# Patient Record
(demographics unavailable — no encounter records)

---

## 2024-10-31 NOTE — CARDIOLOGY SUMMARY
[de-identified] :  Echo: Echo 3/8/2023. Normal LV function. EF 65%. Delayed septal activation consistent with LBBB. Mild diastolic dysfunction. Trivial mitral thickening with trivial MR. Trivial aortic sclerosis with trivial AI. Small anterior effusion without hemodynamic compromise. No prior echo for comparison.

## 2024-10-31 NOTE — PHYSICAL EXAM
[Well Developed] : well developed [Well Nourished] : well nourished [No Acute Distress] : no acute distress [Normal Conjunctiva] : normal conjunctiva [Normal Venous Pressure] : normal venous pressure [No Carotid Bruit] : no carotid bruit [Normal S1, S2] : normal S1, S2 [No Murmur] : no murmur [No Rub] : no rub [No Gallop] : no gallop [Clear Lung Fields] : clear lung fields [Good Air Entry] : good air entry [No Respiratory Distress] : no respiratory distress  [Soft] : abdomen soft [Non Tender] : non-tender [No Masses/organomegaly] : no masses/organomegaly [Normal Bowel Sounds] : normal bowel sounds [Moves all extremities] : moves all extremities [No Focal Deficits] : no focal deficits [Normal Speech] : normal speech [No ulcers] : no ulcers [No varicosities] : no varicosities [No chronic venous stasis changes] : no chronic venous stasis changes [No cyanosis] : no cyanosis [No rashes] : no rashes [Normal peripheral pulses] : : normal peripheral pulses [de-identified] : L > R edema. non pitting

## 2024-10-31 NOTE — HISTORY OF PRESENT ILLNESS
[FreeTextEntry1] : 87F patient of Dr. Mccullough here today to transition care. History of:  Hypertension History of LBBB Hyperlipidemia Valvular heart disease hypothyroidism Chronic kidney disease stage III   10/31/24: Referred by Dr. Ronquillo regarding CADET with diaphoresis, and history of abnormal EKG. BNP 9/18/24 449.   She notes feeling SOB when she gets excited/nervous/anxious.   No chest pain.   feels tired when she walks, doesnt think she can go 1 block.  has lost some weight.  chronic leg swelling L > R for many years.  No hx of DVT, PE, CVA, or MI.

## 2024-10-31 NOTE — ASSESSMENT
[FreeTextEntry1] : 87F    Prior records personally reviewed. Previously followed by Dr. 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  Very low suspicion for heart related edema.  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   Will arrange for LE venous US to rule out chronic dvt and evidence of reflux.   Dyspnea - poor functional status, dyspnea is in setting of excitable/stressful events.  Known chronic LBBB with normal LVEF.  Will update TTE and obtain coronary CTA given symptoms and CAD risk factors.  Not a good treadmill candidate.   EKG today given SOB - unchanged LBBB, sinus HLD - continue lipitor 40mg, LDL 96, acceptable range.  No hx of CVA/MI/vascular event HTN  - controlled on low dose norvasc 2.5mg daily Obesity - counseled on weight loss

## 2024-12-12 NOTE — ASSESSMENT
[FreeTextEntry1] : Essential hypertension - Low salt diet and wt loss urged but with her obesity and age activity will be difficult. CV alluded to starting arb/diuretic. Blood pressure appears to be better controlled so we will continue present meds.  Reviewed echocardiogram ordered by cardiologist: 11/25/2024 echo- EF 55%; mild MR, mild-moderate AR Acquired hypothyroidism - Clinically euthyroid as patient states she had tremor x years and has had normal TSHs with tremors.  Continue med and adjust per TSH. Last TSH at goal 2.35  Familial hypercholesterolemia - Continue Atorvastatin 40 mg daily. Check lipid profile and lft's. Low chol diet. Urged to increase efforts at weight loss. Last LDL at goal 94 Sept  Edema- minimal and d/w pt I would not start diuretic therapy for this degree of swelling.    Chronic kidney disease stage III-Discussed with patient previously. Avoid nephrotoxic meds and tests/procedures. Follow electrolytes and renal function test. Last eGFR was BETTER 57  Chronic knee pain- especially on her left. Continue Celebrex if renal function permits. Discussed with patient.

## 2024-12-12 NOTE — HISTORY OF PRESENT ILLNESS
[FreeTextEntry1] : 87 y.o. woman here for hypertension, hyperlipidemia, hypothyroidism, intermittent edema Saw CV on 10/31/2024 [de-identified] : HYPERCHOLESTEROLEMIA- Major cardiovascular risk factors include hyperlipidemia, hypertension, age, morbid obesity. The lipid profile goals for the patient are LDL goal is less than 70 mg/dl. Dietary modifications have included reduced fat intake. Exercise modifications have included WALKING. The side effects of the medication include NONE.  Patient denies myalgias. Since the patient's last visit with me, the weight has CHANGED BY -4 HYPERTENSION- Lifestyle modifications that the patient has adopted include dietary sodium reduction, increasing physical activity, attempts at weight reduction. Adverse effects of the medication include NONE. Patient denies chest pain, palpitations, headaches, dizziness. HYPOTHYROIDISM Compared to the last visit, the hypothyroidism has stabilized. Symptoms consistent with hypothyroidism/hyperthyroidism are ABSENT.  The symptoms consistent with hyperthyroidism are ABSENT.  Side effects of the medications include NONE. Compliance with medical regimen has been GOOD.      Signs of abuse or neglect? No. Fall Risk/History of Falling Yes.

## 2024-12-12 NOTE — PHYSICAL EXAM
[Well Nourished] : well nourished [No JVD] : no jugular venous distention [No Respiratory Distress] : no respiratory distress  [No Accessory Muscle Use] : no accessory muscle use [Clear to Auscultation] : lungs were clear to auscultation bilaterally [Normal Rate] : normal rate  [Regular Rhythm] : with a regular rhythm [Normal S1, S2] : normal S1 and S2 [No Carotid Bruits] : no carotid bruits [de-identified] : obese [de-identified] : no xanthelasmas [de-identified] : +1/6 RAUL LLSB [de-identified] : trace LE edema  [de-identified] : +well healed right knee wound erythematous and warm

## 2024-12-12 NOTE — REVIEW OF SYSTEMS
[Nocturia] : nocturia [Memory Loss] : memory loss [Anxiety] : anxiety [Depression] : depression [Fever] : no fever [Chills] : no chills [Chest Pain] : no chest pain [Palpitations] : no palpitations [Shortness Of Breath] : no shortness of breath [Wheezing] : no wheezing [Cough] : no cough [Constipation] : no constipation [Joint Swelling] : no joint swelling [Muscle Weakness] : no muscle weakness [Skin Rash] : no skin rash [FreeTextEntry8] : 3-4 nocturia [FreeTextEntry9] : +right knee warm continuously [de-identified] : slight memory problems

## 2025-02-07 NOTE — CARDIOLOGY SUMMARY
[de-identified] :  Echo 10/31/24: 1. Left ventricular wall thickness is normal. Abnormal (paradoxical) septal motion consistent with conduction delay. Left ventricular systolic function is normal with an ejection fraction visually estimated at 55 %. 2. The left ventricular diastolic function is indeterminate. 3. Normal right ventricular cavity size, with normal wall thickness, and normal right ventricular systolic function. 4. Mild mitral regurgitation. 5. Mild to moderate aortic regurgitation. 6. Pulmonary artery systolic pressure could not be estimated. 7. No pericardial effusion seen.   Echo: Echo 3/8/2023. Normal LV function. EF 65%. Delayed septal activation consistent with LBBB. Mild diastolic dysfunction. Trivial mitral thickening with trivial MR. Trivial aortic sclerosis with trivial AI. Small anterior effusion without hemodynamic compromise. No prior echo for comparison.   [de-identified] : 	 BILATERAL LOWER EXTREMITY VENOUS REFLUX 1/23/25: 1. No evidence of right and left lower extremity thrombosis. 2. Technically difficult study.   CT FFR NI EST CTA W INT REP 1/13/25: The results of the CT-FFR analysis are:  LAD: 0.87 (1)  LCx / OM1: 0.86 (1)  RCA: 0.92 (1)   CTA HEART 1/2/25: Total Agatston Score: 477.  LM Agatston Score: 0.  LAD Agatston Score: 191.  LCX Agatston Score: 23.  RCA Agatston Score: 263.  Cardiac:  1.  The calcium score is severe at 477 Agatston units.  2.  Moderate stenosis of the mid LAD  3.  Mild to moderate stenosis of the OM1 and mid RCA  4.  Remaining segments demonstrate non obstructive coronary artery disease.  Based on the coronary findings, FFR-CT Coronary Analysis ([employer] [employer]) is recommended Non-cardiac:  3.3 x 1.6 cm left lower lobe peripheral pleural-based masslike opacity which may represent pneumonia or neoplasm. Additional up to 8 mm lung nodules.  Recommend pulmonary consultation and a CT chest in one month.  Distal esophageal wall thickening with hypervascularity. Tiny hiatal hernia. Recommend gastroenterology consultation.

## 2025-02-07 NOTE — HISTORY OF PRESENT ILLNESS
[FreeTextEntry1] : 88F patient of Dr. Mccullough here today to transition care. History of:  Hypertension History of LBBB Hyperlipidemia Valvular heart disease hypothyroidism Chronic kidney disease stage III   10/31/24: Referred by Dr. Ronquillo regarding MCMAHAN with diaphoresis, and history of abnormal EKG. BNP 9/18/24 449.   She notes feeling SOB when she gets excited/nervous/anxious.   No chest pain.   feels tired when she walks, doesnt think she can go 1 block.  has lost some weight.  chronic leg swelling L > R for many years.  No hx of DVT, PE, CVA, or MI.     2/7/25:  returns post testing. unchanged mcmahan, accompanied by daughter who notes she walks very little.  no chest pain

## 2025-02-07 NOTE — ASSESSMENT
[FreeTextEntry1] : 87F    Prior records personally reviewed. Previously followed by Dr. Mccullough  Chronic LE Edema  -  this is likely lipedema, non pitting, and related to her significant concentration of adipose tissue.  Very low suspicion for heart related edema.  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   LE venous US with no DVT.   Dyspnea - poor functional status, dyspnea is in setting of excitable/stressful events.  Known chronic LBBB with normal LVEF.  TTE with normal LVEF, mild to mod AI.  Coronary CTA with moderate non obstructive CAD, FFR neg mid LAD stenosis.  EKG today for CADET/CAD - again noted sinus rhythm LBBB HLD - increase lipitor to 80mg given coronary plaque burden, LDL 96.  No hx of CVA/MI/vascular event.  Will not start aspirin at this time given esophageal thickening noted on CT, needs follow up with PCP as well for incidental lung nodule.  Repeat CT chest recommended by radiology.  HTN  - controlled, continue low dose norvasc 2.5mg daily Obesity - counseled on weight loss

## 2025-02-07 NOTE — PHYSICAL EXAM
[Well Developed] : well developed [Well Nourished] : well nourished [No Acute Distress] : no acute distress [Normal Conjunctiva] : normal conjunctiva [Normal Venous Pressure] : normal venous pressure [No Carotid Bruit] : no carotid bruit [Normal S1, S2] : normal S1, S2 [No Murmur] : no murmur [No Rub] : no rub [No Gallop] : no gallop [Clear Lung Fields] : clear lung fields [Good Air Entry] : good air entry [No Respiratory Distress] : no respiratory distress  [Soft] : abdomen soft [Non Tender] : non-tender [No Masses/organomegaly] : no masses/organomegaly [Normal Bowel Sounds] : normal bowel sounds [Moves all extremities] : moves all extremities [No Focal Deficits] : no focal deficits [Normal Speech] : normal speech [No ulcers] : no ulcers [No varicosities] : no varicosities [No chronic venous stasis changes] : no chronic venous stasis changes [No cyanosis] : no cyanosis [No rashes] : no rashes [Normal peripheral pulses] : : normal peripheral pulses [de-identified] : L > R edema. non pitting, + lipedema

## 2025-03-17 NOTE — REVIEW OF SYSTEMS
[Nocturia] : nocturia [Memory Loss] : memory loss [Anxiety] : anxiety [Depression] : depression [Fever] : no fever [Chills] : no chills [Chest Pain] : no chest pain [Palpitations] : no palpitations [Shortness Of Breath] : no shortness of breath [Wheezing] : no wheezing [Cough] : no cough [Constipation] : no constipation [Joint Swelling] : no joint swelling [Muscle Weakness] : no muscle weakness [Skin Rash] : no skin rash [FreeTextEntry8] : 3-4 nocturia [FreeTextEntry9] : +right knee warm continuously [de-identified] : slight memory problems

## 2025-03-17 NOTE — ASSESSMENT
[FreeTextEntry1] : Essential hypertension - Low salt diet and wt loss urged but with her obesity and age activity will be difficult. CV alluded to starting arb/diuretic. Blood pressure appears to be better controlled so we will continue present meds.  Reviewed echocardiogram ordered by cardiologist: 11/25/2024 echo- EF 55%; mild MR, mild-moderate AR  Acquired hypothyroidism - Clinically euthyroid as patient states she had tremor x years and has had normal TSHs with tremors.  Continue med and adjust per TSH. Last TSH at goal 3.33   Familial hypercholesterolemia - Continue Atorvastatin 40 mg daily. Check lipid profile and lft's. Low chol diet. Urged to increase efforts at weight loss.  Her LDL goal is less than 70. Last LDL NOT at goal 124 Dec. I will Increase her atorvastatin to 80 mg daily today as upon review of her LDLs, she has not been under 70 which is her goal.  Edema- minimal and d/w pt I would not start diuretic therapy for this degree of swelling.    Chronic kidney disease stage III-Discussed with patient previously. Avoid nephrotoxic meds and tests/procedures. Follow electrolytes and renal function test. Last eGFR was STABLE 56  Chronic knee pain- especially on her left. Continue Celebrex if renal function permits. Discussed with patient.  At the end of the visit, we discussed her cardiologist most recent note regarding her coronary artery disease. We also discussed the incidental findings noted including a left lower lobe nodule as big as 3.3 cm. We discussed, in the presence of her daughter Henrietta, the need for a dedicated CT scan of the chest with contrast and follow-up with pulmonary consultation.  This was ordered for her today.

## 2025-03-17 NOTE — PHYSICAL EXAM
[Well Nourished] : well nourished [No JVD] : no jugular venous distention [No Respiratory Distress] : no respiratory distress  [No Accessory Muscle Use] : no accessory muscle use [Clear to Auscultation] : lungs were clear to auscultation bilaterally [Normal Rate] : normal rate  [Regular Rhythm] : with a regular rhythm [Normal S1, S2] : normal S1 and S2 [No Carotid Bruits] : no carotid bruits [de-identified] : obese [de-identified] : no xanthelasmas [de-identified] : +1/6 RAUL LLSB [de-identified] : trace LE edema  [de-identified] : +well healed right knee wound erythematous and warm

## 2025-03-17 NOTE — PLAN
[FreeTextEntry1] : Check labs and continue meds except increase atorvastatin to 80 mg daily Return 3 months  Chest CT with contrast already pulmonary nodules Pulmonary consultation

## 2025-03-17 NOTE — HISTORY OF PRESENT ILLNESS
[FreeTextEntry1] : 88 y.o. woman here for hypertension, hyperlipidemia, hypothyroidism, intermittent edema Saw CV on 1/2025; had CTA showing incidental bilateral pulmonary nodules;  one up to 3.3 cm [de-identified] : HYPERCHOLESTEROLEMIA- Major cardiovascular risk factors include hyperlipidemia, hypertension, age, morbid obesity. The lipid profile goals for the patient are LDL goal is less than 70 mg/dl. Dietary modifications have included reduced fat intake. Exercise modifications have included WALKING. The side effects of the medication include NONE.  Patient denies myalgias. Since the patient's last visit with me, the weight has CHANGED BY 0 HYPERTENSION- Lifestyle modifications that the patient has adopted include dietary sodium reduction, increasing physical activity, attempts at weight reduction. Adverse effects of the medication include NONE. Patient denies chest pain, palpitations, headaches, dizziness. HYPOTHYROIDISM Compared to the last visit, the hypothyroidism has stabilized. Symptoms consistent with hypothyroidism/hyperthyroidism are ABSENT.  The symptoms consistent with hyperthyroidism are ABSENT.  Side effects of the medications include NONE. Compliance with medical regimen has been GOOD.      Signs of abuse or neglect? No. Fall Risk/History of Falling Yes.

## 2025-04-29 NOTE — HISTORY OF PRESENT ILLNESS
[TextBox_4] : 88 year old female with CAD, HTN came in for initial evaluation of lung nodules. CT chest seen, my read: unchanged LtLL band like opacity compared to 01/25, unchanged 6 mm nodule RtLL, 2 other nodules 5 and 2 mm unchanged. Denies any respiratory complaints Was never diagnosed with a lung disease Never smoker FHX: denies

## 2025-06-18 NOTE — ASSESSMENT
[FreeTextEntry1] : Essential hypertension - Low salt diet and weight loss urged but with her obesity and age activity will be difficult. CV alluded to starting arb/diuretic. Blood pressure appears to be better controlled so we will continue amlodipine 2.5 mg daily.  Reviewed echocardiogram ordered by cardiologist: 11/25/2024 echo- EF 55%; mild MR, mild-moderate AR  Acquired hypothyroidism - Clinically euthyroid as patient states she had tremor x years and has had normal TSHs with tremors.  Continue med and adjust per TSH. Last TSH at goal 2.15 Mar  Familial hypercholesterolemia - Continue Atorvastatin 40 mg daily. Check lipid profile and LFTs. Low cholesterol diet. Urged to increase efforts at weight loss.  Her LDL goal is less than 70. Last LDL NOT at goal 115 Mar  Edema- minimal and d/w pt I would not start diuretic therapy for this degree of swelling.    Chronic kidney disease stage III-Discussed with patient previously. Avoid nephrotoxic meds and tests/procedures. Follow electrolytes and renal function test. Last eGFR was STABLE 49 Mar  Chronic knee pain- especially on her left. Continue Celebrex if renal function permits. Discussed with patient.  We reviewed her last CT scan of the lungs which showed some fibrotic changes. Plan is to repeat her CAT scan in October of this year. I will see her in September and order her CAT scan for the following month.

## 2025-06-18 NOTE — PLAN
[FreeTextEntry1] : Check labs and continue meds Return 3 months  Chest CT with contrast in October regarding pulmonary nodules

## 2025-06-18 NOTE — HISTORY OF PRESENT ILLNESS
[FreeTextEntry1] : 88 y.o. woman here for hypertension, hyperlipidemia, hypothyroidism, intermittent edema We increased her atorvastatin to 80 mg last visit She had a follow-up CT scan of her lungs in April  [de-identified] : HYPERCHOLESTEROLEMIA- Major cardiovascular risk factors include hyperlipidemia, hypertension, age, morbid obesity. The lipid profile goals for the patient are LDL goal is less than 70 mg/dl. Dietary modifications have included reduced fat intake. Exercise modifications have included WALKING. The side effects of the medication include NONE.  Patient denies myalgias. Since the patient's last visit with me, the weight has CHANGED BY +5 HYPERTENSION- Lifestyle modifications that the patient has adopted include dietary sodium reduction, increasing physical activity, attempts at weight reduction. Adverse effects of the medication include NONE. Patient denies chest pain, palpitations, headaches, dizziness. HYPOTHYROIDISM Compared to the last visit, the hypothyroidism has stabilized. Symptoms consistent with hypothyroidism/hyperthyroidism are ABSENT.  The symptoms consistent with hyperthyroidism are ABSENT.  Side effects of the medications include NONE. Compliance with medical regimen has been GOOD.      Signs of abuse or neglect? No. Fall Risk/History of Falling Yes.

## 2025-06-18 NOTE — REVIEW OF SYSTEMS
[Nocturia] : nocturia [Memory Loss] : memory loss [Anxiety] : anxiety [Depression] : depression [Fever] : no fever [Chills] : no chills [Chest Pain] : no chest pain [Palpitations] : no palpitations [Shortness Of Breath] : no shortness of breath [Wheezing] : no wheezing [Cough] : no cough [Constipation] : no constipation [Joint Swelling] : no joint swelling [Muscle Weakness] : no muscle weakness [Skin Rash] : no skin rash [FreeTextEntry8] : 3-4 nocturia [FreeTextEntry9] : +right knee warm continuously [de-identified] : slight memory problems

## 2025-06-18 NOTE — PHYSICAL EXAM
[Well Nourished] : well nourished [No JVD] : no jugular venous distention [No Respiratory Distress] : no respiratory distress  [No Accessory Muscle Use] : no accessory muscle use [Clear to Auscultation] : lungs were clear to auscultation bilaterally [Normal Rate] : normal rate  [Regular Rhythm] : with a regular rhythm [Normal S1, S2] : normal S1 and S2 [No Carotid Bruits] : no carotid bruits [de-identified] : obese [de-identified] : no xanthelasmas [de-identified] : +1/6 RAUL LLSB [de-identified] : trace LE edema  [de-identified] : +well healed right knee wound erythematous and warm